# Patient Record
Sex: MALE | Race: WHITE
[De-identification: names, ages, dates, MRNs, and addresses within clinical notes are randomized per-mention and may not be internally consistent; named-entity substitution may affect disease eponyms.]

---

## 2018-05-01 ENCOUNTER — HOSPITAL ENCOUNTER (EMERGENCY)
Dept: HOSPITAL 53 - M ED | Age: 53
Discharge: HOME | End: 2018-05-01
Payer: MEDICARE

## 2018-05-01 DIAGNOSIS — E11.9: ICD-10-CM

## 2018-05-01 DIAGNOSIS — F17.200: ICD-10-CM

## 2018-05-01 DIAGNOSIS — Z79.4: ICD-10-CM

## 2018-05-01 DIAGNOSIS — J00: Primary | ICD-10-CM

## 2018-05-01 PROCEDURE — 71046 X-RAY EXAM CHEST 2 VIEWS: CPT

## 2018-06-04 ENCOUNTER — HOSPITAL ENCOUNTER (OUTPATIENT)
Dept: HOSPITAL 53 - M LAB | Age: 53
End: 2018-06-04
Attending: NURSE PRACTITIONER
Payer: MEDICARE

## 2018-06-04 DIAGNOSIS — E78.00: ICD-10-CM

## 2018-06-04 DIAGNOSIS — Z79.899: ICD-10-CM

## 2018-06-04 DIAGNOSIS — E11.9: Primary | ICD-10-CM

## 2018-06-04 LAB
25(OH)D3 SERPL-MCNC: 38.9 NG/ML (ref 30–100)
ALBUMIN/GLOBULIN RATIO: 1.09 (ref 1–1.93)
ALBUMIN: 3.8 GM/DL (ref 3.2–5.2)
ALKALINE PHOSPHATASE: 100 U/L (ref 45–117)
ALT SERPL W P-5'-P-CCNC: 32 U/L (ref 12–78)
ANION GAP: 7 MEQ/L (ref 8–16)
AST SERPL-CCNC: 18 U/L (ref 7–37)
BASO #: 0.1 10^3/UL (ref 0–0.2)
BASO %: 0.6 % (ref 0–1)
BILIRUBIN,TOTAL: 0.4 MG/DL (ref 0.2–1)
BLOOD UREA NITROGEN: 14 MG/DL (ref 7–18)
CALCIUM LEVEL: 8.7 MG/DL (ref 8.5–10.1)
CARBON DIOXIDE LEVEL: 27 MEQ/L (ref 21–32)
CHLORIDE LEVEL: 108 MEQ/L (ref 98–107)
CHOLEST SERPL-MCNC: 168 MG/DL (ref ?–200)
CHOLESTEROL RISK RATIO: 3.36 (ref ?–5)
CREATININE FOR GFR: 0.83 MG/DL (ref 0.7–1.3)
EOS #: 0.2 10^3/UL (ref 0–0.5)
EOSINOPHIL NFR BLD AUTO: 2 % (ref 0–3)
EST. AVERAGE GLUCOSE BLD GHB EST-MCNC: 171 MG/DL (ref 60–110)
FREE T4: 1.06 NG/DL (ref 0.76–1.46)
GFR SERPL CREATININE-BSD FRML MDRD: > 60 ML/MIN/{1.73_M2} (ref 56–?)
GLUCOSE, FASTING: 180 MG/DL (ref 70–100)
HDLC SERPL-MCNC: 50 MG/DL (ref 40–?)
HEMATOCRIT: 41.2 % (ref 42–52)
HEMOGLOBIN: 14.4 G/DL (ref 13.5–17.5)
IMMATURE GRANULOCYTE #: 0 10^3/UL (ref 0–0)
IMMATURE GRANULOCYTE %: 0.3 % (ref 0–3)
LDL CHOLESTEROL: 98.8 MG/DL (ref ?–100)
LYMPH #: 2.4 10^3/UL (ref 1.5–4.5)
LYMPH %: 30.4 % (ref 24–44)
MEAN CORPUSCULAR HEMOGLOBIN: 31.4 PG (ref 27–33)
MEAN CORPUSCULAR HGB CONC: 35 G/DL (ref 32–36.5)
MEAN CORPUSCULAR VOLUME: 90 FL (ref 80–96)
MONO #: 0.8 10^3/UL (ref 0–0.8)
MONO %: 10.2 % (ref 0–5)
NEUTROPHILS #: 4.5 10^3/UL (ref 1.8–7.7)
NEUTROPHILS %: 56.5 % (ref 36–66)
NONHDLC SERPL-MCNC: 118 MG/DL
NRBC BLD AUTO-RTO: 0 % (ref 0–0)
PLATELET COUNT, AUTOMATED: 326 10^3/UL (ref 150–450)
POTASSIUM SERUM: 4.6 MEQ/L (ref 3.5–5.1)
RED BLOOD COUNT: 4.58 10^6/UL (ref 4.3–6.1)
RED CELL DISTRIBUTION WIDTH: 12.4 % (ref 11.5–14.5)
SODIUM LEVEL: 142 MEQ/L (ref 136–145)
THYROID STIMULATING HORMONE: 1.77 UIU/ML (ref 0.36–3.74)
TOTAL PROTEIN: 7.3 GM/DL (ref 6.4–8.2)
TRIGLYCERIDES LEVEL: 96 MG/DL (ref ?–150)
WHITE BLOOD COUNT: 8 10^3/UL (ref 4–10)

## 2018-06-04 PROCEDURE — 84443 ASSAY THYROID STIM HORMONE: CPT

## 2018-09-10 ENCOUNTER — HOSPITAL ENCOUNTER (OUTPATIENT)
Dept: HOSPITAL 53 - M LAB | Age: 53
End: 2018-09-10
Payer: MEDICARE

## 2018-09-10 DIAGNOSIS — E55.9: ICD-10-CM

## 2018-09-10 DIAGNOSIS — E11.9: ICD-10-CM

## 2018-09-10 DIAGNOSIS — E78.00: Primary | ICD-10-CM

## 2018-09-10 DIAGNOSIS — Z79.899: ICD-10-CM

## 2018-09-10 LAB
25(OH)D3 SERPL-MCNC: 49.8 NG/ML (ref 30–100)
ALBUMIN/GLOBULIN RATIO: 1.05 (ref 1–1.93)
ALBUMIN: 3.8 GM/DL (ref 3.2–5.2)
ALKALINE PHOSPHATASE: 71 U/L (ref 45–117)
ALT SERPL W P-5'-P-CCNC: 36 U/L (ref 12–78)
ANION GAP: 7 MEQ/L (ref 8–16)
AST SERPL-CCNC: 18 U/L (ref 7–37)
BASO #: 0.1 10^3/UL (ref 0–0.2)
BASO %: 0.5 % (ref 0–1)
BILIRUBIN,TOTAL: 0.5 MG/DL (ref 0.2–1)
BLOOD UREA NITROGEN: 14 MG/DL (ref 7–18)
CALCIUM LEVEL: 8.9 MG/DL (ref 8.5–10.1)
CARBON DIOXIDE LEVEL: 27 MEQ/L (ref 21–32)
CHLORIDE LEVEL: 108 MEQ/L (ref 98–107)
CHOLEST SERPL-MCNC: 194 MG/DL (ref ?–200)
CHOLESTEROL RISK RATIO: 3.29 (ref ?–5)
CREATININE FOR GFR: 0.8 MG/DL (ref 0.7–1.3)
EOS #: 0.2 10^3/UL (ref 0–0.5)
EOSINOPHIL NFR BLD AUTO: 1.8 % (ref 0–3)
FREE T4: 1.21 NG/DL (ref 0.76–1.46)
GFR SERPL CREATININE-BSD FRML MDRD: > 60 ML/MIN/{1.73_M2} (ref 56–?)
GLUCOSE, FASTING: 108 MG/DL (ref 70–100)
HDLC SERPL-MCNC: 59 MG/DL (ref 40–?)
HEMATOCRIT: 43.4 % (ref 42–52)
HEMOGLOBIN: 15.1 G/DL (ref 13.5–17.5)
IMMATURE GRANULOCYTE #: 0 10^3/UL (ref 0–0)
IMMATURE GRANULOCYTE %: 0.4 % (ref 0–3)
LDL CHOLESTEROL: 104.2 MG/DL (ref ?–100)
LYMPH #: 2.7 10^3/UL (ref 1.5–4.5)
LYMPH %: 27.4 % (ref 24–44)
MEAN CORPUSCULAR HEMOGLOBIN: 31.9 PG (ref 27–33)
MEAN CORPUSCULAR HGB CONC: 34.8 G/DL (ref 32–36.5)
MEAN CORPUSCULAR VOLUME: 91.6 FL (ref 80–96)
MONO #: 1 10^3/UL (ref 0–0.8)
MONO %: 9.8 % (ref 0–5)
NEUTROPHILS #: 5.9 10^3/UL (ref 1.8–7.7)
NEUTROPHILS %: 60.1 % (ref 36–66)
NONHDLC SERPL-MCNC: 135 MG/DL
NRBC BLD AUTO-RTO: 0 % (ref 0–0)
PLATELET COUNT, AUTOMATED: 324 10^3/UL (ref 150–450)
POTASSIUM SERUM: 4.3 MEQ/L (ref 3.5–5.1)
RED BLOOD COUNT: 4.74 10^6/UL (ref 4.3–6.1)
RED CELL DISTRIBUTION WIDTH: 12.9 % (ref 11.5–14.5)
SODIUM LEVEL: 142 MEQ/L (ref 136–145)
THYROID STIMULATING HORMONE: 1.24 UIU/ML (ref 0.36–3.74)
TOTAL PROTEIN: 7.4 GM/DL (ref 6.4–8.2)
TRIGLYCERIDES LEVEL: 154 MG/DL (ref ?–150)
WHITE BLOOD COUNT: 9.8 10^3/UL (ref 4–10)

## 2018-09-10 PROCEDURE — 84443 ASSAY THYROID STIM HORMONE: CPT

## 2018-09-11 LAB — EST. AVERAGE GLUCOSE BLD GHB EST-MCNC: 206 MG/DL (ref 60–110)

## 2019-04-09 ENCOUNTER — HOSPITAL ENCOUNTER (OUTPATIENT)
Dept: HOSPITAL 53 - M LAB | Age: 54
End: 2019-04-09
Attending: PHYSICIAN ASSISTANT
Payer: MEDICARE

## 2019-04-09 DIAGNOSIS — I10: ICD-10-CM

## 2019-04-09 DIAGNOSIS — R53.83: Primary | ICD-10-CM

## 2019-04-09 DIAGNOSIS — Z12.5: ICD-10-CM

## 2019-04-09 DIAGNOSIS — E11.9: ICD-10-CM

## 2019-04-09 LAB
ALBUMIN SERPL BCG-MCNC: 3.5 GM/DL (ref 3.2–5.2)
ALT SERPL W P-5'-P-CCNC: 26 U/L (ref 12–78)
BASOPHILS # BLD AUTO: 0.1 10^3/UL (ref 0–0.2)
BASOPHILS NFR BLD AUTO: 0.6 % (ref 0–1)
BILIRUB SERPL-MCNC: 0.4 MG/DL (ref 0.2–1)
BUN SERPL-MCNC: 16 MG/DL (ref 7–18)
CALCIUM SERPL-MCNC: 8.7 MG/DL (ref 8.5–10.1)
CHLORIDE SERPL-SCNC: 107 MEQ/L (ref 98–107)
CHOLEST SERPL-MCNC: 231 MG/DL (ref ?–200)
CHOLEST/HDLC SERPL: 4.05 {RATIO} (ref ?–5)
CO2 SERPL-SCNC: 28 MEQ/L (ref 21–32)
CREAT SERPL-MCNC: 0.8 MG/DL (ref 0.7–1.3)
EOSINOPHIL # BLD AUTO: 0.3 10^3/UL (ref 0–0.5)
EOSINOPHIL NFR BLD AUTO: 2.9 % (ref 0–3)
EST. AVERAGE GLUCOSE BLD GHB EST-MCNC: 217 MG/DL (ref 60–110)
GFR SERPL CREATININE-BSD FRML MDRD: > 60 ML/MIN/{1.73_M2} (ref 56–?)
GLUCOSE SERPL-MCNC: 259 MG/DL (ref 70–100)
HCT VFR BLD AUTO: 41.4 % (ref 42–52)
HDLC SERPL-MCNC: 57 MG/DL (ref 40–?)
HGB BLD-MCNC: 14.1 G/DL (ref 13.5–17.5)
LDLC SERPL CALC-MCNC: 147 MG/DL (ref ?–100)
LYMPHOCYTES # BLD AUTO: 2.4 10^3/UL (ref 1.5–4.5)
LYMPHOCYTES NFR BLD AUTO: 27.9 % (ref 24–44)
MCH RBC QN AUTO: 31.7 PG (ref 27–33)
MCHC RBC AUTO-ENTMCNC: 34.1 G/DL (ref 32–36.5)
MCV RBC AUTO: 93 FL (ref 80–96)
MONOCYTES # BLD AUTO: 1.1 10^3/UL (ref 0–0.8)
MONOCYTES NFR BLD AUTO: 12.3 % (ref 0–5)
NEUTROPHILS # BLD AUTO: 4.9 10^3/UL (ref 1.8–7.7)
NEUTROPHILS NFR BLD AUTO: 56 % (ref 36–66)
NONHDLC SERPL-MCNC: 174 MG/DL
PLATELET # BLD AUTO: 316 10^3/UL (ref 150–450)
POTASSIUM SERPL-SCNC: 4.4 MEQ/L (ref 3.5–5.1)
PROT SERPL-MCNC: 6.7 GM/DL (ref 6.4–8.2)
PSA SERPL-MCNC: 0.78 NG/ML (ref ?–4)
RBC # BLD AUTO: 4.45 10^6/UL (ref 4.3–6.1)
SODIUM SERPL-SCNC: 140 MEQ/L (ref 136–145)
T4 FREE SERPL-MCNC: 0.98 NG/DL (ref 0.76–1.46)
TRIGL SERPL-MCNC: 137 MG/DL (ref ?–150)
TSH SERPL DL<=0.005 MIU/L-ACNC: 1.08 UIU/ML (ref 0.36–3.74)
WBC # BLD AUTO: 8.8 10^3/UL (ref 4–10)

## 2020-01-09 ENCOUNTER — HOSPITAL ENCOUNTER (OUTPATIENT)
Dept: HOSPITAL 53 - M PLAIMG | Age: 55
End: 2020-01-09
Attending: THORACIC SURGERY (CARDIOTHORACIC VASCULAR SURGERY)
Payer: MEDICARE

## 2020-01-09 DIAGNOSIS — R91.1: Primary | ICD-10-CM

## 2020-01-09 DIAGNOSIS — R07.89: ICD-10-CM

## 2020-01-09 NOTE — REPPI
Chest x-ray:  Three views.

 

History:  Chest pain.

 

Comparison study:  May 1, 2018.

 

Findings:  There is a stable noncalcified soft tissue nodule 1.2 cm in diameter

in the lingular segment of the left upper lobe.  This is unchanged in the

19-month interval since the prior study.

 

Lung fields are otherwise clear.  Pleural angles are sharp.  Heart is not

enlarged.  No bony abnormality is seen.

 

Impression:

 

1.2 cm noncalcified nodule in the lingular segment of the left upper lobe

unchanged from May 1, 2018.  A 6-month follow-up chest x-ray suggested to

document stability over a 2-year interval.  Otherwise no active disease.

 

 

Electronically Signed by

Rafael Newman MD 01/09/2020 08:21 P

## 2020-01-17 ENCOUNTER — HOSPITAL ENCOUNTER (OUTPATIENT)
Dept: HOSPITAL 53 - M PLALAB | Age: 55
End: 2020-01-17
Attending: THORACIC SURGERY (CARDIOTHORACIC VASCULAR SURGERY)
Payer: MEDICARE

## 2020-01-17 DIAGNOSIS — R22.2: ICD-10-CM

## 2020-01-17 DIAGNOSIS — Z01.812: Primary | ICD-10-CM

## 2020-01-17 LAB
BUN SERPL-MCNC: 15 MG/DL (ref 7–18)
CREAT SERPL-MCNC: 0.93 MG/DL (ref 0.7–1.3)
GFR SERPL CREATININE-BSD FRML MDRD: > 60 ML/MIN/{1.73_M2} (ref 56–?)

## 2020-01-20 ENCOUNTER — HOSPITAL ENCOUNTER (OUTPATIENT)
Dept: HOSPITAL 53 - M RAD | Age: 55
End: 2020-01-20
Attending: THORACIC SURGERY (CARDIOTHORACIC VASCULAR SURGERY)
Payer: MEDICARE

## 2020-01-20 DIAGNOSIS — R07.9: ICD-10-CM

## 2020-01-20 DIAGNOSIS — R22.2: Primary | ICD-10-CM

## 2020-01-20 PROCEDURE — 71552 MRI CHEST W/O & W/DYE: CPT

## 2020-01-21 NOTE — REP
MRI left chest wall without and with IV contrast:

 

History:  Pain and question of mass left pectoral and anterior portion of axilla.

Attention MRI from the lower neck to level of the nipples.  The patient reports

pain in the left armpit going to the chest.

 

Contrast enhancement dose is 18 mL of intravenous ProHance.  Axial, coronal and

sagittal imaging planes are utilized for T1 and T2-weighted scans obtained with

and without fat saturation.

 

Comparison chest x-ray made 01/20/2018.

 

Findings:  Normal appearing submandibular glands are visible at the top of the

imaging field of view.  One or two normal anterior cervical and submental lymph

nodes are seen.  No evidence of cervical lymphadenopathy or brachial plexus mass.

There is no evidence of apical chest wall or lung mass.  No mediastinal mass or

adenopathy is seen.  There is no evidence of pleural effusion on either side.

The pectoralis and other chest wall musculature appears normal on T1 and

T2-weighted scans.  There are three or four normal-sized axillary lymph nodes

visible in each axilla.  These have preserved hilar fat content architecture and

none is pathologically enlarged.  No vascular lesion is seen.  The sternum,

costal cartilages, and ribs show normal bone signal intensity.  No abnormal fluid

collection is seen.  The clavicle and the visualized portions of the scapula are

unremarkable.

 

Postcontrast images show no abnormal contrast enhancement.

 

Impression:

 

Unremarkable MRI study of the left chest wall lower neck and axillary region.

 

 

Electronically Signed by

Rafael Newman MD 01/21/2020 09:55 A